# Patient Record
Sex: FEMALE | Race: BLACK OR AFRICAN AMERICAN | NOT HISPANIC OR LATINO | URBAN - METROPOLITAN AREA
[De-identification: names, ages, dates, MRNs, and addresses within clinical notes are randomized per-mention and may not be internally consistent; named-entity substitution may affect disease eponyms.]

---

## 2024-09-26 ENCOUNTER — OFFICE VISIT (OUTPATIENT)
Dept: URGENT CARE | Age: 44
End: 2024-09-26
Payer: MEDICARE

## 2024-09-26 VITALS
DIASTOLIC BLOOD PRESSURE: 74 MMHG | TEMPERATURE: 98.3 F | RESPIRATION RATE: 18 BRPM | HEART RATE: 82 BPM | OXYGEN SATURATION: 95 % | SYSTOLIC BLOOD PRESSURE: 116 MMHG

## 2024-09-26 DIAGNOSIS — N76.5 VAGINAL ULCERATION: Primary | ICD-10-CM

## 2024-09-26 PROCEDURE — 87529 HSV DNA AMP PROBE: CPT

## 2024-09-26 RX ORDER — AMLODIPINE BESYLATE 10 MG/1
TABLET ORAL
COMMUNITY
Start: 2024-09-21

## 2024-09-26 NOTE — PROGRESS NOTES
Subjective   Patient ID: Loren Flowers is a 44 y.o. female. They present today with a chief complaint of irritated vagina (Area in vagina is irritated. Not itchy, but red. The area burns only when urine touches it. 1 week of this symptoms. ).    History of Present Illness  Patient is a 44-year-old -American female, no significant past medical history, presenting to the clinic for chief complaint of vaginal irritation.  Patient is reporting an area around her vagina that is irritated and itchy.  States when urine hits this area it burns.  States has been ongoing for the past couple of days.  She denies any vaginal bleeding or discharge.  No burning with urination frequency urgency.  No further complaints.          Past Medical History  Allergies as of 09/26/2024    (Not on File)       (Not in a hospital admission)         Past Medical History:   Diagnosis Date    Personal history of other diseases of the circulatory system     History of hypertension       No past surgical history on file.         Review of Systems  Review of Systems            All review of systems negative unless stated in HPI.                    Objective    There were no vitals filed for this visit.  No LMP recorded.    Physical Exam  General: Vitals Noted. No distress. Normocephalic.     HEENT: TMs normal, EOMI, normal conjunctiva, patent nares, Normal OP    Neck: Supple with no adenopathy.     Cardiac: Regular Rate and Rhythm. No murmur.     Pulmonary: Equal breath sounds bilaterally. No wheezes, rhonchi, or rales.    Abdomen: Soft, non-tender, with normal bowel sounds.     Genitourinary: External evaluation does reveal a small approximately 0.5 cm circular ulceration to the inferior labia majora.  It is mildly tender to palpation.  There is no surrounding erythema induration or warmth.  There is no drainage.    Musculoskeletal: Moves all extremities, no effusion, no edema.     Skin: No obvious rashes.  Procedures    Point of Care  Test & Imaging Results from this visit    No results found.    Diagnostic study results (if any) were reviewed by Hussein Anna PA-C.    Assessment/Plan   Allergies, medications, history, and pertinent labs/EKGs/Imaging reviewed by Hussein Anna PA-C.     Medical Decision Making  Patient was seen evaluate in the clinic for chief complaint of vaginal irritation.  On exam patient is nontoxic well-appearing respect comfortably no acute distress.  Vital signs are stable, afebrile.  Chest clear heart is regular, belly soft and nontender.   evaluation as above.  Lesion/ulceration is highly suspicious for primary syphilis.  I had a very extensive conversation with the patient regarding options for testing and treatment as we are not able to draw blood at this facility nor do we have penicillin G in the clinic for treatment.  I offered to place an order for her to go have blood work performed at [facility] with plans to follow-up with her OB/GYN for treatment if positive result however patient got upset we do not have the antibiotic at this facility and walked out of the clinic.  I highly advise she either report to the ED at this time for testing and treatment or follow-up very closely with her OB/GYN.    Orders and Diagnoses  There are no diagnoses linked to this encounter.      Medical Admin Record      Follow Up Instructions  No follow-ups on file.    Patient disposition: Home    Electronically signed by Hussein Anna PA-C  11:36 AM

## 2024-09-27 LAB
HSV1 DNA SKIN QL NAA+PROBE: NOT DETECTED
HSV2 DNA SKIN QL NAA+PROBE: NOT DETECTED